# Patient Record
Sex: FEMALE | Race: WHITE | NOT HISPANIC OR LATINO | RURAL
[De-identification: names, ages, dates, MRNs, and addresses within clinical notes are randomized per-mention and may not be internally consistent; named-entity substitution may affect disease eponyms.]

---

## 2021-05-12 ENCOUNTER — OFFICE VISIT (OUTPATIENT)
Dept: RURAL CLINIC 8 | Facility: CLINIC | Age: 66
End: 2021-05-12
Payer: MEDICARE

## 2021-05-12 DIAGNOSIS — R12 HEARTBURN: ICD-10-CM

## 2021-05-12 DIAGNOSIS — Z86.010 HISTORY OF COLONIC POLYPS: ICD-10-CM

## 2021-05-12 PROCEDURE — 99203 OFFICE O/P NEW LOW 30 MIN: CPT | Performed by: INTERNAL MEDICINE

## 2021-05-12 RX ORDER — SODIUM, POTASSIUM,MAG SULFATES 17.5-3.13G
177 ML SOLUTION, RECONSTITUTED, ORAL ORAL
Qty: 1 KIT | Refills: 0 | OUTPATIENT
Start: 2021-05-12

## 2021-05-12 RX ORDER — BISACODYL 5 MG
TAKE 4 TABLET, DELAYED RELEASE (ENTERIC COATED) ORAL
Qty: 4 | OUTPATIENT
Start: 2021-05-12 | End: 2021-05-13

## 2021-06-24 ENCOUNTER — OFFICE VISIT (OUTPATIENT)
Dept: RURAL MEDICAL CENTER 4 | Facility: MEDICAL CENTER | Age: 66
End: 2021-06-24
Payer: MEDICARE

## 2021-06-24 DIAGNOSIS — K29.60 ADENOPAPILLOMATOSIS GASTRICA: ICD-10-CM

## 2021-06-24 DIAGNOSIS — K21.00 ALKALINE REFLUX ESOPHAGITIS: ICD-10-CM

## 2021-06-24 DIAGNOSIS — K63.5 BENIGN COLON POLYP: ICD-10-CM

## 2021-06-24 DIAGNOSIS — Z86.010 H/O ADENOMATOUS POLYP OF COLON: ICD-10-CM

## 2021-06-24 PROCEDURE — 43239 EGD BIOPSY SINGLE/MULTIPLE: CPT | Performed by: INTERNAL MEDICINE

## 2021-06-24 PROCEDURE — 45380 COLONOSCOPY AND BIOPSY: CPT | Performed by: INTERNAL MEDICINE

## 2021-06-24 RX ORDER — FAMOTIDINE 40 MG/1
1 TABLET TABLET, FILM COATED ORAL ONCE A DAY
Qty: 90 | Refills: 3 | OUTPATIENT
Start: 2021-06-24

## 2021-06-24 RX ORDER — SODIUM, POTASSIUM,MAG SULFATES 17.5-3.13G
177 ML SOLUTION, RECONSTITUTED, ORAL ORAL
Qty: 1 KIT | Refills: 0 | Status: ON HOLD | COMMUNITY
Start: 2021-05-12

## 2021-07-21 PROBLEM — 428283002: Status: ACTIVE | Noted: 2021-05-12

## 2021-10-13 ENCOUNTER — OFFICE VISIT (OUTPATIENT)
Dept: RURAL CLINIC 8 | Facility: CLINIC | Age: 66
End: 2021-10-13
Payer: MEDICARE

## 2021-10-13 DIAGNOSIS — K57.90 DIVERTICULOSIS: ICD-10-CM

## 2021-10-13 DIAGNOSIS — K21.00 GASTROESOPHAGEAL REFLUX DISEASE WITH ESOPHAGITIS WITHOUT HEMORRHAGE: ICD-10-CM

## 2021-10-13 DIAGNOSIS — K20.90 ESOPHAGITIS: ICD-10-CM

## 2021-10-13 DIAGNOSIS — K44.9 HIATAL HERNIA: ICD-10-CM

## 2021-10-13 PROBLEM — 397881000: Status: ACTIVE | Noted: 2021-10-13

## 2021-10-13 PROBLEM — 266433003: Status: ACTIVE | Noted: 2021-10-13

## 2021-10-13 PROCEDURE — 99213 OFFICE O/P EST LOW 20 MIN: CPT | Performed by: INTERNAL MEDICINE

## 2021-10-13 RX ORDER — SODIUM, POTASSIUM,MAG SULFATES 17.5-3.13G
177 ML SOLUTION, RECONSTITUTED, ORAL ORAL
Qty: 1 KIT | Refills: 0 | Status: DISCONTINUED | COMMUNITY
Start: 2021-05-12

## 2021-10-13 RX ORDER — FAMOTIDINE 40 MG/1
1 TABLET TABLET, FILM COATED ORAL ONCE A DAY
Qty: 90 | Refills: 3 | Status: ACTIVE | COMMUNITY
Start: 2021-06-24

## 2021-10-13 RX ORDER — FAMOTIDINE 40 MG/1
1 TABLET TABLET, FILM COATED ORAL ONCE A DAY
Qty: 90 | Refills: 3
Start: 2021-06-24

## 2021-10-13 NOTE — HPI-TODAY'S VISIT:
Patient comes for follow-up.  In June of 2021 we did EGD and colonoscopy.  She had grade B erosive esophagitis and small hiatal hernia.  There was also pan diverticulosis in the colon and 2 small hyperplastic polyps in the distal colon.  She returns to the [place] office today in follow-up.

## 2021-11-10 NOTE — HPI-TODAY'S VISIT:
Pt comes for evaluation of colon polyps. she endorses having a colonoscopy about four years ago. she has had a polyp removed. no FH of colon cancer. - She says that she does not have any problems with constipation. sometimes her stools are loose. no blood in the stool. she has had a hysterectomy in the 80s. Abnormal VS & WBC/Abnormal Lactate

## 2021-11-24 ENCOUNTER — OFFICE VISIT (OUTPATIENT)
Dept: RURAL CLINIC 8 | Facility: CLINIC | Age: 66
End: 2021-11-24

## 2021-12-27 ENCOUNTER — ERX REFILL RESPONSE (OUTPATIENT)
Dept: RURAL CLINIC 2 | Facility: CLINIC | Age: 66
End: 2021-12-27

## 2021-12-27 RX ORDER — FAMOTIDINE 40 MG/1
1 TABLET TABLET, FILM COATED ORAL ONCE A DAY
Qty: 90 | Refills: 3 | OUTPATIENT

## 2021-12-27 RX ORDER — FAMOTIDINE 40 MG/1
TAKE ONE TABLET BY MOUTH ONCE DAILY TABLET, FILM COATED ORAL
Qty: 90 TABLET | Refills: 1 | OUTPATIENT

## 2022-12-06 ENCOUNTER — ERX REFILL RESPONSE (OUTPATIENT)
Dept: RURAL CLINIC 2 | Facility: CLINIC | Age: 67
End: 2022-12-06

## 2022-12-06 RX ORDER — FAMOTIDINE 40 MG/1
TAKE ONE TABLET BY MOUTH ONCE DAILY TABLET, FILM COATED ORAL
Qty: 90 TABLET | Refills: 0 | OUTPATIENT

## 2022-12-06 RX ORDER — FAMOTIDINE 40 MG/1
TAKE ONE TABLET BY MOUTH ONCE DAILY TABLET, FILM COATED ORAL
Qty: 90 TABLET | Refills: 1 | OUTPATIENT

## 2023-08-09 ENCOUNTER — DASHBOARD ENCOUNTERS (OUTPATIENT)
Age: 68
End: 2023-08-09

## 2023-08-09 ENCOUNTER — OFFICE VISIT (OUTPATIENT)
Dept: RURAL CLINIC 8 | Facility: CLINIC | Age: 68
End: 2023-08-09
Payer: MEDICARE

## 2023-08-09 VITALS
SYSTOLIC BLOOD PRESSURE: 118 MMHG | WEIGHT: 170.6 LBS | DIASTOLIC BLOOD PRESSURE: 80 MMHG | HEIGHT: 68 IN | TEMPERATURE: 97.3 F | HEART RATE: 69 BPM | BODY MASS INDEX: 25.85 KG/M2

## 2023-08-09 DIAGNOSIS — K21.9 GERD WITHOUT ESOPHAGITIS: ICD-10-CM

## 2023-08-09 DIAGNOSIS — Z86.010 HISTORY OF COLON POLYPS: ICD-10-CM

## 2023-08-09 PROBLEM — 428283002: Status: ACTIVE | Noted: 2023-08-09

## 2023-08-09 PROBLEM — 266435005: Status: ACTIVE | Noted: 2023-08-09

## 2023-08-09 PROCEDURE — 99213 OFFICE O/P EST LOW 20 MIN: CPT | Performed by: INTERNAL MEDICINE

## 2023-08-09 RX ORDER — FAMOTIDINE 40 MG/1
TAKE ONE TABLET BY MOUTH ONCE DAILY TABLET, FILM COATED ORAL
Qty: 90 TABLET | Refills: 0 | Status: ACTIVE | COMMUNITY

## 2023-08-09 RX ORDER — FAMOTIDINE 40 MG/1
TAKE ONE TABLET, FILM COATED ORAL DAILY
Qty: 90 | Refills: 3

## 2023-08-09 NOTE — HPI-TODAY'S VISIT:
Patient comes for follow-up.  In June of 2021 we did EGD and colonoscopy.  She had grade B erosive esophagitis and small hiatal hernia.  There was also pan diverticulosis in the colon and 2 small hyperplastic polyps in the distal colon.  She returns to the [place] office today in follow-up. -   08/09/2023: doing well not having any acid reflux continues on famotidine daily.  No dysphagia or any other GI tract symptoms.

## 2025-01-10 ENCOUNTER — OFFICE VISIT (OUTPATIENT)
Dept: RURAL CLINIC 8 | Facility: CLINIC | Age: 70
End: 2025-01-10

## 2025-01-27 PROBLEM — 235595009: Status: ACTIVE | Noted: 2025-01-27

## 2025-01-27 PROBLEM — 398311004: Status: ACTIVE | Noted: 2025-01-27

## 2025-01-29 ENCOUNTER — OFFICE VISIT (OUTPATIENT)
Dept: RURAL CLINIC 8 | Facility: CLINIC | Age: 70
End: 2025-01-29
Payer: COMMERCIAL

## 2025-01-29 VITALS
HEIGHT: 68 IN | SYSTOLIC BLOOD PRESSURE: 127 MMHG | WEIGHT: 180 LBS | HEART RATE: 69 BPM | DIASTOLIC BLOOD PRESSURE: 87 MMHG | BODY MASS INDEX: 27.28 KG/M2 | TEMPERATURE: 98 F

## 2025-01-29 DIAGNOSIS — K44.9 HIATAL HERNIA: ICD-10-CM

## 2025-01-29 DIAGNOSIS — Z86.0102 HISTORY OF HYPERPLASTIC POLYP OF COLON: ICD-10-CM

## 2025-01-29 DIAGNOSIS — Z87.19 HISTORY OF DIVERTICULITIS: ICD-10-CM

## 2025-01-29 DIAGNOSIS — K21.9 CHRONIC GERD: ICD-10-CM

## 2025-01-29 PROCEDURE — 99213 OFFICE O/P EST LOW 20 MIN: CPT | Performed by: INTERNAL MEDICINE

## 2025-01-29 RX ORDER — FAMOTIDINE 40 MG/1
TAKE ONE TABLET, FILM COATED ORAL DAILY
Qty: 90 | Refills: 3

## 2025-01-29 RX ORDER — FAMOTIDINE 40 MG/1
TAKE ONE TABLET, FILM COATED ORAL DAILY
Qty: 90 | Refills: 3 | Status: ACTIVE | COMMUNITY

## 2025-01-29 NOTE — HPI-TODAY'S VISIT:
Patient comes for follow-up.  In June of 2021 we did EGD and colonoscopy.  She had grade B erosive esophagitis and small hiatal hernia.  There was also pan diverticulosis in the colon and 2 small hyperplastic polyps in the distal colon.  She returns to the [place] office today in follow-up. -   08/09/2023: doing well not having any acid reflux continues on famotidine daily.  No dysphagia or any other GI tract symptoms. - 1/29/2025:In June 2021 I performed this patient's bidirectional endoscopy.  She had grade B erosive esophagitis and a small hiatal hernia.  Colonoscopy was significant for pandiverticulosis along with several small hyperplastic polyps.  A 5-year surveillance interval was recommended.